# Patient Record
Sex: FEMALE | Race: WHITE | NOT HISPANIC OR LATINO | Employment: PART TIME | URBAN - NONMETROPOLITAN AREA
[De-identification: names, ages, dates, MRNs, and addresses within clinical notes are randomized per-mention and may not be internally consistent; named-entity substitution may affect disease eponyms.]

---
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
## 2025-01-22 ENCOUNTER — OFFICE VISIT (OUTPATIENT)
Dept: OBSTETRICS AND GYNECOLOGY | Age: 20
End: 2025-01-22
Payer: MEDICAID

## 2025-01-22 VITALS
DIASTOLIC BLOOD PRESSURE: 78 MMHG | WEIGHT: 227 LBS | BODY MASS INDEX: 40.22 KG/M2 | SYSTOLIC BLOOD PRESSURE: 122 MMHG | HEIGHT: 63 IN

## 2025-01-22 DIAGNOSIS — Z30.011 ENCOUNTER FOR INITIAL PRESCRIPTION OF CONTRACEPTIVE PILLS: ICD-10-CM

## 2025-01-22 DIAGNOSIS — N92.6 IRREGULAR MENSES: ICD-10-CM

## 2025-01-22 DIAGNOSIS — N92.6 ABNORMAL MENSES: Primary | ICD-10-CM

## 2025-01-22 LAB
B-HCG UR QL: NEGATIVE
EXPIRATION DATE: NORMAL
INTERNAL NEGATIVE CONTROL: NEGATIVE
INTERNAL POSITIVE CONTROL: POSITIVE
Lab: NORMAL

## 2025-01-22 RX ORDER — NORETHINDRONE ACETATE AND ETHINYL ESTRADIOL, ETHINYL ESTRADIOL AND FERROUS FUMARATE 1MG-10(24)
1 KIT ORAL DAILY
Qty: 28 TABLET | Refills: 12 | Status: SHIPPED | OUTPATIENT
Start: 2025-01-22 | End: 2025-02-19

## 2025-01-22 NOTE — PROGRESS NOTES
"    Moiz Nino MD  [facility]  [address]  [place] [address]  Office [phone]  Fax [phone]      [facility]  Raul Ross  : 2005  MRN: [id number]    Subjective   Subjective     Chief Complaint   Patient presents with    Contraception     Pt here for f/u on irregular menses. Started Drospirenone-Estetrol on 10/22. States she only took it for 2 months, then stopped due to mood changes. Has not been sexually active since that time. She feels she does ultimately want something for period control, as she has not had a period since 2 months ago when she stopped OCP.       History of Present Illness  Raul Ross is a 19 y.o. female , , who comes to the office today for follow-up for irregular menses. Took OCP for 2 months but stopped secondary to mood changes/irritability/agitation. Not sexually active during that time. No menses since 2 months ago when she stopped OCP. Wants to restart something. .      Review of Systems   Genitourinary:  Positive for menstrual problem. Negative for decreased urine volume, difficulty urinating, dyspareunia, dysuria, enuresis, flank pain, frequency, genital sores, hematuria, pelvic pain, urgency, vaginal bleeding, vaginal discharge and vaginal pain.   All other systems reviewed and are negative.       The following portions of the patient's history were reviewed and updated as appropriate: allergies, current medications, past family history, past medical history, past social history, past surgical history, and problem list.    Objective    Objective     Vitals:   Visit Vitals  /78   Ht 160 cm (63\")   Wt 103 kg (227 lb)   LMP 2024 (Approximate)   BMI 40.21 kg/m²        Physical Exam  Vitals reviewed.   Constitutional:       General: She is not in acute distress.     Appearance: Normal appearance. She is not ill-appearing.   HENT:      Head: Normocephalic and atraumatic.      Nose: No congestion or rhinorrhea.   Eyes:      " General: No scleral icterus.        Right eye: No discharge.         Left eye: No discharge.      Extraocular Movements: Extraocular movements intact.      Conjunctiva/sclera: Conjunctivae normal.   Pulmonary:      Effort: Pulmonary effort is normal. No accessory muscle usage or respiratory distress.   Musculoskeletal:      Right lower leg: No edema.      Left lower leg: No edema.   Skin:     General: Skin is warm and dry.      Coloration: Skin is not ashen, cyanotic or jaundiced.   Neurological:      General: No focal deficit present.      Mental Status: She is alert and oriented to person, place, and time.   Psychiatric:         Mood and Affect: Mood normal.         Behavior: Behavior is cooperative.       Result Review    POC Pregnancy, Urine (01/22/2025 13:52) negative        Assessment & Plan   Assessment / Plan     Diagnoses and all orders for this visit:    1. Abnormal menses (Primary)  -     POC Pregnancy, Urine  -     Norethin-Eth Estrad-Fe Biphas (Lo Loestrin Fe) 1 MG-10 MCG / 10 MCG tablet; Take 1 tablet by mouth Daily for 28 days.  Dispense: 28 tablet; Refill: 12    2. Irregular menses  -     Norethin-Eth Estrad-Fe Biphas (Lo Loestrin Fe) 1 MG-10 MCG / 10 MCG tablet; Take 1 tablet by mouth Daily for 28 days.  Dispense: 28 tablet; Refill: 12    3. Encounter for initial prescription of contraceptive pills  -     Norethin-Eth Estrad-Fe Biphas (Lo Loestrin Fe) 1 MG-10 MCG / 10 MCG tablet; Take 1 tablet by mouth Daily for 28 days.  Dispense: 28 tablet; Refill: 12        Discussion: Options discussed. She desires menstrual period regulation. OCPs discussed. Trial at lower dose. Return for recheck. Questions answered. She expressed understanding.     Follow-up: Return in about 3 months (around 4/22/2025) for GYN Follow-up.    Moiz Nino MD